# Patient Record
Sex: FEMALE | Race: WHITE | URBAN - METROPOLITAN AREA
[De-identification: names, ages, dates, MRNs, and addresses within clinical notes are randomized per-mention and may not be internally consistent; named-entity substitution may affect disease eponyms.]

---

## 2017-09-01 ENCOUNTER — HOSPITAL ENCOUNTER (EMERGENCY)
Facility: CLINIC | Age: 22
Discharge: HOME | End: 2017-09-01
Attending: EMERGENCY MEDICINE | Admitting: EMERGENCY MEDICINE
Payer: COMMERCIAL

## 2017-09-01 VITALS
OXYGEN SATURATION: 98 % | TEMPERATURE: 98.7 F | WEIGHT: 160 LBS | HEART RATE: 70 BPM | SYSTOLIC BLOOD PRESSURE: 129 MMHG | DIASTOLIC BLOOD PRESSURE: 87 MMHG | RESPIRATION RATE: 16 BRPM

## 2017-09-01 DIAGNOSIS — M54.41 ACUTE RIGHT-SIDED LOW BACK PAIN WITH RIGHT-SIDED SCIATICA: ICD-10-CM

## 2017-09-01 LAB
ALBUMIN UR-MCNC: NEGATIVE MG/DL
APPEARANCE UR: CLEAR
BACTERIA #/AREA URNS HPF: ABNORMAL /HPF
BILIRUB UR QL STRIP: NEGATIVE
COLOR UR AUTO: ABNORMAL
GLUCOSE UR STRIP-MCNC: NEGATIVE MG/DL
HCG UR QL: NEGATIVE
HGB UR QL STRIP: ABNORMAL
KETONES UR STRIP-MCNC: NEGATIVE MG/DL
LEUKOCYTE ESTERASE UR QL STRIP: NEGATIVE
MUCOUS THREADS #/AREA URNS LPF: PRESENT /LPF
NITRATE UR QL: NEGATIVE
PH UR STRIP: 7 PH (ref 5–7)
RBC #/AREA URNS AUTO: 1 /HPF (ref 0–2)
SOURCE: ABNORMAL
SP GR UR STRIP: 1.01 (ref 1–1.03)
SQUAMOUS #/AREA URNS AUTO: <1 /HPF (ref 0–1)
UROBILINOGEN UR STRIP-MCNC: 0 MG/DL (ref 0–2)
WBC #/AREA URNS AUTO: 1 /HPF (ref 0–2)

## 2017-09-01 PROCEDURE — 81025 URINE PREGNANCY TEST: CPT | Performed by: EMERGENCY MEDICINE

## 2017-09-01 PROCEDURE — 99285 EMERGENCY DEPT VISIT HI MDM: CPT | Mod: 25

## 2017-09-01 PROCEDURE — 96375 TX/PRO/DX INJ NEW DRUG ADDON: CPT

## 2017-09-01 PROCEDURE — 81001 URINALYSIS AUTO W/SCOPE: CPT | Performed by: EMERGENCY MEDICINE

## 2017-09-01 PROCEDURE — 25000128 H RX IP 250 OP 636: Performed by: EMERGENCY MEDICINE

## 2017-09-01 PROCEDURE — 96374 THER/PROPH/DIAG INJ IV PUSH: CPT

## 2017-09-01 RX ORDER — ACETAMINOPHEN 325 MG/1
650 TABLET ORAL EVERY 6 HOURS PRN
Qty: 100 TABLET | Refills: 0 | Status: SHIPPED | OUTPATIENT
Start: 2017-09-01

## 2017-09-01 RX ORDER — DIAZEPAM 10 MG/2ML
5 INJECTION, SOLUTION INTRAMUSCULAR; INTRAVENOUS ONCE
Status: COMPLETED | OUTPATIENT
Start: 2017-09-01 | End: 2017-09-01

## 2017-09-01 RX ORDER — KETOROLAC TROMETHAMINE 15 MG/ML
15 INJECTION, SOLUTION INTRAMUSCULAR; INTRAVENOUS ONCE
Status: COMPLETED | OUTPATIENT
Start: 2017-09-01 | End: 2017-09-01

## 2017-09-01 RX ORDER — NAPROXEN 500 MG/1
500 TABLET ORAL 2 TIMES DAILY WITH MEALS
Qty: 16 TABLET | Refills: 0 | Status: SHIPPED | OUTPATIENT
Start: 2017-09-01 | End: 2017-09-09

## 2017-09-01 RX ORDER — HYDROMORPHONE HYDROCHLORIDE 1 MG/ML
0.5 INJECTION, SOLUTION INTRAMUSCULAR; INTRAVENOUS; SUBCUTANEOUS
Status: DISCONTINUED | OUTPATIENT
Start: 2017-09-01 | End: 2017-09-01 | Stop reason: HOSPADM

## 2017-09-01 RX ORDER — CYCLOBENZAPRINE HCL 10 MG
10 TABLET ORAL
Qty: 14 TABLET | Refills: 1 | Status: SHIPPED | OUTPATIENT
Start: 2017-09-01 | End: 2017-09-02

## 2017-09-01 RX ADMIN — KETOROLAC TROMETHAMINE 15 MG: 15 INJECTION, SOLUTION INTRAMUSCULAR; INTRAVENOUS at 11:07

## 2017-09-01 RX ADMIN — DIAZEPAM 5 MG: 5 INJECTION, SOLUTION INTRAMUSCULAR; INTRAVENOUS at 11:11

## 2017-09-01 RX ADMIN — HYDROMORPHONE HYDROCHLORIDE 0.5 MG: 1 INJECTION, SOLUTION INTRAMUSCULAR; INTRAVENOUS; SUBCUTANEOUS at 12:27

## 2017-09-01 ASSESSMENT — ENCOUNTER SYMPTOMS
DYSURIA: 0
VOMITING: 0
BACK PAIN: 1
CHILLS: 0
NUMBNESS: 1
FEVER: 0
FREQUENCY: 0
SHORTNESS OF BREATH: 0

## 2017-09-01 NOTE — DISCHARGE INSTRUCTIONS
Please see your PCP in 2-3 days for a recheck.  If you have increasing pain, loss of bowel or bladder function, numbness in your groin, unable to walk, fevers >101 or other acute changes, return to the ED.      May take tylenol and ibuprofen for pain.  If breakthrough pain may try flexeril.  Do not drink drive or operate heavy machinery.     Discharge Instructions  Back Pain  You were seen today for back pain. Back pain can have many causes, but most will get better without surgery or other specific treatment. Sometimes there is a herniated ( slipped ) disc. We do not usually do MRI scans to look for these right away, since most herniated discs will get better on their own with time.  Today, we did not find any evidence that your back pain was caused by a serious condition. However, sometimes symptoms develop over time and cannot be found during an emergency visit, so it is very important that you follow up with your primary provider.  Generally, every Emergency Department visit should have a follow-up clinic visit with either a primary or a specialty clinic/provider. Please follow-up as instructed by your emergency provider today.    Return to the Emergency Department if:    You develop a fever with your back pain.     You have weakness or change in sensation in one or both legs.    You lose control of your bowels or bladder, or cannot empty your bladder (cannot pee).    Your pain gets much worse.     Follow-up with your provider:    Unless your pain has completely gone away, please make an appointment with your provider within one week. Most of the routine care for back pain is available in a clinic and not the Emergency Department. You may need further management of your back pain, such as more pain medication, imaging such as an X-ray or MRI, or physical therapy.    What can I do to help myself?    Remain Active -- People are often afraid that they will hurt their back further or delay recovery by remaining  active, but this is one of the best things you can do for your back. In fact, staying in bed for a long time to rest is not recommended. Studies have shown that people with low back pain recover faster when they remain active. Movement helps to bring blood flow to the muscles and relieve muscle spasms as well as preventing loss of muscle strength.    Heat -- Using a heating pad can help with low back pain during the first few weeks. Do not sleep with a heating pad, as you can be burned.     Pain medications - You may take a pain medication such as Tylenol  (acetaminophen), Advil , Motrin  (ibuprofen) or Aleve  (naproxen).  If you were given a prescription for medicine here today, be sure to read all of the information (including the package insert) that comes with your prescription.  This will include important information about the medicine, its side effects, and any warnings that you need to know about.  The pharmacist who fills the prescription can provide more information and answer questions you may have about the medicine.  If you have questions or concerns that the pharmacist cannot address, please call or return to the Emergency Department.   Remember that you can always come back to the Emergency Department if you are not able to see your regular provider in the amount of time listed above, if you get any new symptoms, or if there is anything that worries you.

## 2017-09-01 NOTE — ED AVS SNAPSHOT
[facility] Emergency Department    [address]    [facility] [address]    Phone:  [phone]    Fax:  [phone]                                       Otto Curran   MRN: [id number]    Department:  [facility] Emergency Department   Date of Visit:  9/1/2017           After Visit Summary Signature Page     I have received my discharge instructions, and my questions have been answered. I have discussed any challenges I see with this plan with the nurse or doctor.    ..........................................................................................................................................  Patient/Patient Representative Signature      ..........................................................................................................................................  Patient Representative Print Name and Relationship to Patient    ..................................................               ................................................  Date                                            Time    ..........................................................................................................................................  Reviewed by Signature/Title    ...................................................              ..............................................  Date                                                            Time

## 2017-09-01 NOTE — ED AVS SNAPSHOT
[facility] Emergency Department    [address]    [facility] [address]    Phone:  [phone]    Fax:  [phone]                                       Otto Curran   MRN: [id number]    Department:  [facility] Emergency Department   Date of Visit:  9/1/2017           Patient Information     Date Of Birth          1995        Your diagnoses for this visit were:     Acute right-sided low back pain with right-sided sciatica        You were seen by Patricia Garay MD.      Follow-up Information     Follow up with No Ref-Primary, Physician. Go in 3 days.        Discharge Instructions       Please see your PCP in 2-3 days for a recheck.  If you have increasing pain, loss of bowel or bladder function, numbness in your groin, unable to walk, fevers >101 or other acute changes, return to the ED.      May take tylenol and ibuprofen for pain.  If breakthrough pain may try flexeril.  Do not drink drive or operate heavy machinery.     Discharge Instructions  Back Pain  You were seen today for back pain. Back pain can have many causes, but most will get better without surgery or other specific treatment. Sometimes there is a herniated ( slipped ) disc. We do not usually do MRI scans to look for these right away, since most herniated discs will get better on their own with time.  Today, we did not find any evidence that your back pain was caused by a serious condition. However, sometimes symptoms develop over time and cannot be found during an emergency visit, so it is very important that you follow up with your primary provider.  Generally, every Emergency Department visit should have a follow-up clinic visit with either a primary or a specialty clinic/provider. Please follow-up as instructed by your emergency provider today.    Return to the Emergency Department if:    You develop a fever with your back pain.     You have weakness or change in sensation in one or both  legs.    You lose control of your bowels or bladder, or cannot empty your bladder (cannot pee).    Your pain gets much worse.     Follow-up with your provider:    Unless your pain has completely gone away, please make an appointment with your provider within one week. Most of the routine care for back pain is available in a clinic and not the Emergency Department. You may need further management of your back pain, such as more pain medication, imaging such as an X-ray or MRI, or physical therapy.    What can I do to help myself?    Remain Active -- People are often afraid that they will hurt their back further or delay recovery by remaining active, but this is one of the best things you can do for your back. In fact, staying in bed for a long time to rest is not recommended. Studies have shown that people with low back pain recover faster when they remain active. Movement helps to bring blood flow to the muscles and relieve muscle spasms as well as preventing loss of muscle strength.    Heat -- Using a heating pad can help with low back pain during the first few weeks. Do not sleep with a heating pad, as you can be burned.     Pain medications - You may take a pain medication such as Tylenol  (acetaminophen), Advil , Motrin  (ibuprofen) or Aleve  (naproxen).  If you were given a prescription for medicine here today, be sure to read all of the information (including the package insert) that comes with your prescription.  This will include important information about the medicine, its side effects, and any warnings that you need to know about.  The pharmacist who fills the prescription can provide more information and answer questions you may have about the medicine.  If you have questions or concerns that the pharmacist cannot address, please call or return to the Emergency Department.   Remember that you can always come back to the Emergency Department if you are not able to see your regular provider in the amount  of time listed above, if you get any new symptoms, or if there is anything that worries you.      24 Hour Appointment Hotline       To make an appointment at any [facility]3], call [phone] ([phone]). If you don't have a family doctor or clinic, we will help you find one. [facility] clinics are conveniently located to serve the needs of you and your family.             Review of your medicines      START taking        Dose / Directions Last dose taken    acetaminophen 325 MG tablet   Commonly known as:  TYLENOL   Dose:  650 mg   Quantity:  100 tablet        Take 2 tablets (650 mg) by mouth every 6 hours as needed for mild pain   Refills:  0        cyclobenzaprine 10 MG tablet   Commonly known as:  FLEXERIL   Dose:  10 mg   Quantity:  14 tablet        Take 1 tablet (10 mg) by mouth nightly as needed for muscle spasms   Refills:  1        naproxen 500 MG tablet   Commonly known as:  NAPROSYN   Dose:  500 mg   Quantity:  16 tablet        Take 1 tablet (500 mg) by mouth 2 times daily (with meals) for 8 days   Refills:  0                Prescriptions were sent or printed at these locations (3 Prescriptions)                   Other Prescriptions                Printed at Department/Unit printer (3 of 3)         naproxen (NAPROSYN) 500 MG tablet               acetaminophen (TYLENOL) 325 MG tablet               cyclobenzaprine (FLEXERIL) 10 MG tablet                Procedures and tests performed during your visit     HCG qualitative urine    UA with Microscopic      Orders Needing Specimen Collection     None      Pending Results     No orders found from 8/30/2017 to 9/2/2017.            Pending Culture Results     No orders found from 8/30/2017 to 9/2/2017.            Pending Results Instructions     If you had any lab results that were not finalized at the time of your Discharge, you can call the ED Lab Result RN at [phone]. You will be contacted by this team for any positive Lab results or changes in  treatment. The nurses are available 7 days a week from 10A to 6:30P.  You can leave a message 24 hours per day and they will return your call.        Test Results From Your Hospital Stay        9/1/2017 10:48 AM      Component Results     Component Value Ref Range & Units Status    Color Urine Straw  Final    Appearance Urine Clear  Final    Glucose Urine Negative NEG^Negative mg/dL Final    Bilirubin Urine Negative NEG^Negative Final    Ketones Urine Negative NEG^Negative mg/dL Final    Specific Gravity Urine 1.008 1.003 - 1.035 Final    Blood Urine Small (A) NEG^Negative Final    pH Urine 7.0 5.0 - 7.0 pH Final    Protein Albumin Urine Negative NEG^Negative mg/dL Final    Urobilinogen mg/dL 0.0 0.0 - 2.0 mg/dL Final    Nitrite Urine Negative NEG^Negative Final    Leukocyte Esterase Urine Negative NEG^Negative Final    Source Midstream Urine  Final    WBC Urine 1 0 - 2 /HPF Final    RBC Urine 1 0 - 2 /HPF Final    Bacteria Urine Few (A) NEG^Negative /HPF Final    Squamous Epithelial /HPF Urine <1 0 - 1 /HPF Final    Mucous Urine Present (A) NEG^Negative /LPF Final         9/1/2017 10:48 AM      Component Results     Component Value Ref Range & Units Status    HCG Qual Urine Negative NEG^Negative Final    This test is for screening purposes.  Results should be interpreted along with   the clinical picture.  Confirmation testing is available if warranted by   ordering [id number], HCG Quantitative Pregnancy.                  Clinical Quality Measure: Blood Pressure Screening     Your blood pressure was checked while you were in the emergency department today. The last reading we obtained was  BP: 109/67 . Please read the guidelines below about what these numbers mean and what you should do about them.  If your systolic blood pressure (the top number) is less than 120 and your diastolic blood pressure (the bottom number) is less than 80, then your blood pressure is normal. There is nothing more that you need to do about  "it.  If your systolic blood pressure (the top number) is 120-139 or your diastolic blood pressure (the bottom number) is 80-89, your blood pressure may be higher than it should be. You should have your blood pressure rechecked within a year by a primary care provider.  If your systolic blood pressure (the top number) is 140 or greater or your diastolic blood pressure (the bottom number) is 90 or greater, you may have high blood pressure. High blood pressure is treatable, but if left untreated over time it can put you at risk for heart attack, stroke, or kidney failure. You should have your blood pressure rechecked by a primary care provider within the next 4 weeks.  If your provider in the emergency department today gave you specific instructions to follow-up with your doctor or provider even sooner than that, you should follow that instruction and not wait for up to 4 weeks for your follow-up visit.        Thank you for choosing [facility]       Thank you for choosing [facility] for your care. Our goal is always to provide you with excellent care. Hearing back from our patients is one way we can continue to improve our services. Please take a few minutes to complete the written survey that you may receive in the mail after you visit with us. Thank you!        [employer] Information     [employer] lets you send messages to your doctor, view your test results, renew your prescriptions, schedule appointments and more. To sign up, go to [email or web address] . Click on \"Log in\" on the left side of the screen, which will take you to the Welcome page. Then click on \"Sign up Now\" on the right side of the page.     You will be asked to enter the access code listed below, as well as some personal information. Please follow the directions to create your username and password.     Your access code is: [email or web address]  Expires: 2017  1:22 PM     Your access code will  in 90 days. If you need help or a new code, please call " your [place] clinic or [phone].        Care EveryWhere ID     This is your Care EveryWhere ID. This could be used by other organizations to access your [place] medical records  [id number]        Equal Access to Services     ALEKSANDR CHIANG : Anahy Man, waaxda luqadaha, qaybta kaalmada chicho, jose leslie. So [facility] [phone].    ATENCIÓN: Si habla español, tiene a lora disposición servicios gratuitos de asistencia lingüística. Llame al [phone].    We comply with applicable federal civil rights laws and [place] laws. We do not discriminate on the basis of race, color, national origin, age, disability sex, sexual orientation or gender identity.            After Visit Summary       This is your record. Keep this with you and show to your community pharmacist(s) and doctor(s) at your next visit.

## 2017-09-01 NOTE — ED PROVIDER NOTES
"  History     Chief Complaint:  Back Pain      HPI   Otto (\"Daxton\") Magdy is a 22 year old transgender male who presents to the emergency department today via EMS for evaluation of back pain. A couple weeks ago, the patient began having right low back radiating through his right hip to his right knee. He was driving to work this morning and had a spasm of unbearable pain so he called EMS. Due to concern for symptoms being \"fatal,\" the patient presents to the emergency department via EMS for further evaluation. Associated symptoms include right foot tingling and numbness. The patient reports having trouble getting out of bed, with aggravated pain while bending, standing, and walking. He took ibuprofen yesterday with no relief. Of note, this morning the patient took 1500 mg of tylenol this morning. He denies incontinence, urinary symptoms, IV drug use, fever, chills, vomiting, chest pain, shortness of breath, or groin numbness. 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 Furthermore, his mother who is a pediatrician told him she thought it was musculoskeletal in nature.    Allergies:  No Known Drug Allergies     Medications:    The patient is currently on no regular medications.    Past Medical History:    Transgender  Depression    Past Surgical History:    History reviewed. No pertinent surgical history.    Family History:    The patient is adopted. Family history unknown.    Social History:  The patient was accompanied to the ED by alone.  Smoking Status: Former (0.02 ppd)  Smokeless Tobacco: Current  Alcohol Use: No  Marital Status:  Single     Review of Systems   Constitutional: Negative for chills and fever.   Respiratory: Negative for shortness of breath.    Cardiovascular: Negative for chest pain.   Gastrointestinal: Negative for vomiting.   Genitourinary: Negative for dysuria and frequency.        Denies incontinence   Musculoskeletal: Positive for back pain (right lower, radiating " through hip to right knee).   Neurological: Positive for numbness (right foot).        Denies groin numbness   All other systems reviewed and are negative.    Physical Exam   First Vitals:  BP: 133/70  Pulse: 70  Temp: 98.7  F (37.1  C)  Resp: 16  Weight: 72.6 kg (160 lb)  SpO2: 98 %      Physical Exam  General: Resting on the bed.  Head: No obvious trauma to head.  Ears, Nose, Throat:  External ears normal.  Nose normal.    Eyes:  Conjunctivae and EOM are normal.  Pupils are equal, round, and reactive.   Neck: Normal range of motion.  Neck supple.   CV: Regular rate and rhythm.  No murmurs.      Respiratory: Effort normal and breath sounds normal.  No wheezing or crackles.   Gastrointestinal: Soft.  No distension. There is no tenderness.    Musculoskeletal: Normal range of motion. Right sided lumbar paraspinous tenderness, no tenderness to midline palpitation, No SI joint pain  Neuro: Alert. Moving all extremities appropriately.  Normal speech. 5/5 strength bilateral lower extremities.  Sensation intact.  Negative straight leg, Mild numbness near right 1st toe.  2+ patellar reflexes.  Normal achilles reflexes.    Skin: Skin is warm and dry.  No rash noted.   Psych: Normal mood and affect. Behavior is normal.     Emergency Department Course     Laboratory:  Laboratory findings were communicated with the patient who voiced understanding of the findings.  UA with Microscopic: Urine Bloo Small, Bacteria Few, Mucous Urine Present, o/w WNL.  HCG Qualitative Urine: Negative    Interventions:  1107 Toradol 15 mg IV  1111 Valium 5 mg IV  1227 Dilaudid 0.5mg IV      Emergency Department Course:  Nursing notes and vitals reviewed.  The patient provided a urine sample here in the emergency department. This was sent for laboratory testing, findings above.  1014: I performed an exam of the patient as documented above.   1149: Patient rechecked and updated.   I discussed the treatment plan with the patient. They expressed  understanding of this plan and consented to discharge. They will be discharged home with instructions for care and follow up. In addition, the patient will return to the emergency department if their symptoms persist, worsen, if new symptoms arise or if there is any concern.  All questions were answered.  I personally reviewed the laboratory results with the Patient and answered all related questions prior to discharge.    Impression & Plan      Medical Decision Making:  Otto Curran is a 22 year old transgender male presenting with low back pain. VS reassuring.  Broad differential considered including but not limited to spasm, epidural abscess/hematoma, spinal cord compression, fracture, etc.  He reports several weeks ago that he pulled his back and has had increasing pain over the last several days. Of note, he denies any bowel or bladder symptoms, IV drug use, any procedures or injections in her back. Overall there are no red flags to suggest acute spinal cord process. He does appear to have some sciatica. On examination he has some tenderness to palpation of the paraspinous muscle. Overall no midline tenderness to palpitation, no history of trauma to indication imaging. Plan to ambulate following pain medications. The patient was ambulatory after above interventions. She had resolution of the numbness over her first toe. She was able to ambulate with only 0-1/10 pain. No focal neurological changes to suggest acute spinal cord pathology. Urinalysis not concerning for UTI/pyelo, pregnancy test negative. Urine did have some small blood but do not suspect this as nephrolithiasis. Based on examination appears to be most likely paraspinous muscle spasm. She was advised to follow up with primary care physician next week as already arranged. Use tylenol, naprosyn, and flexeril for pain. Activity is tolerated. Discharged in stable condition.    Diagnosis:    ICD-10-CM    1. Acute right-sided low back pain with right-sided  sciatica M54.41        Disposition:  Discharged to home    Discharge Medications:  New Prescriptions    ACETAMINOPHEN (TYLENOL) 325 MG TABLET    Take 2 tablets (650 mg) by mouth every 6 hours as needed for mild pain    CYCLOBENZAPRINE (FLEXERIL) 10 MG TABLET    Take 1 tablet (10 mg) by mouth nightly as needed for muscle spasms    NAPROXEN (NAPROSYN) 500 MG TABLET    Take 1 tablet (500 mg) by mouth 2 times daily (with meals) for 8 days         Scribe Disclosure:  Adriana SALDAÑA, am serving as a scribe at 8:53 AM on 9/1/2017 to document services personally performed by Patricia Garay MD based on my observations and the provider's statements to me.   9/1/2017   [facility] EMERGENCY DEPARTMENT       Patricia Garay MD  09/01/17 1933

## 2017-09-02 ENCOUNTER — HOSPITAL ENCOUNTER (EMERGENCY)
Facility: CLINIC | Age: 22
Discharge: HOME | End: 2017-09-02
Attending: EMERGENCY MEDICINE | Admitting: EMERGENCY MEDICINE

## 2017-09-02 VITALS
SYSTOLIC BLOOD PRESSURE: 142 MMHG | HEIGHT: 66 IN | HEART RATE: 80 BPM | BODY MASS INDEX: 25.39 KG/M2 | DIASTOLIC BLOOD PRESSURE: 99 MMHG | TEMPERATURE: 98.2 F | OXYGEN SATURATION: 93 % | WEIGHT: 158 LBS | RESPIRATION RATE: 18 BRPM

## 2017-09-02 DIAGNOSIS — M62.830 LUMBAR PARASPINAL MUSCLE SPASM: ICD-10-CM

## 2017-09-02 DIAGNOSIS — M54.41 ACUTE RIGHT-SIDED LOW BACK PAIN WITH RIGHT-SIDED SCIATICA: ICD-10-CM

## 2017-09-02 PROCEDURE — 99282 EMERGENCY DEPT VISIT SF MDM: CPT

## 2017-09-02 RX ORDER — METHOCARBAMOL 500 MG/1
1000 TABLET, FILM COATED ORAL 3 TIMES DAILY PRN
Qty: 30 TABLET | Refills: 1 | Status: SHIPPED | OUTPATIENT
Start: 2017-09-02 | End: 2017-09-07

## 2017-09-02 ASSESSMENT — ENCOUNTER SYMPTOMS
NUMBNESS: 0
WEAKNESS: 0
BACK PAIN: 1
FEVER: 0

## 2017-09-02 NOTE — ED AVS SNAPSHOT
[facility] Emergency Department    [address]    [facility] [address]    Phone:  [phone]    Fax:  [phone]                                       Otto Curran   MRN: [id number]    Department:  [facility] Emergency Department   Date of Visit:  9/2/2017           After Visit Summary Signature Page     I have received my discharge instructions, and my questions have been answered. I have discussed any challenges I see with this plan with the nurse or doctor.    ..........................................................................................................................................  Patient/Patient Representative Signature      ..........................................................................................................................................  Patient Representative Print Name and Relationship to Patient    ..................................................               ................................................  Date                                            Time    ..........................................................................................................................................  Reviewed by Signature/Title    ...................................................              ..............................................  Date                                                            Time

## 2017-09-02 NOTE — DISCHARGE INSTRUCTIONS
Please see your primary care doctor in the next 5 days for a recheck.   Return to the Emergency Room if you develop fevers, leg weakness, trouble with bowel or bladder function, or if you have any new concerns about your health.   It was my pleasure to take care of you today. Thanks for visiting [facility]   Emergency Room.     Gideon Davis MD       Discharge Instructions   Back Pain   You were seen today for back pain. Back pain can have many causes, but most will get better without surgery or other specific treatment. Sometimes there is a herniated ( slipped ) disc. We don t usually do MRI scans to look for these right away, since most herniated discs will get better on their own with time. Today, we did not find any evidence that your back pain was caused by a serious condition, such as an infection, fracture, or tumor. However, sometimes symptoms develop over time and cannot be found during an emergency visit, so it is very important that you follow up with your primary doctor.  Return to the Emergency Department if:   You develop a fever with your back pain.   You have weakness or change in sensation in one or both legs.   You lose control of your bowels or bladder, or can t empty your bladder.   Your pain gets much worse.   Follow-up with your doctor:   Unless your pain has completely gone away, please make an appointment with your doctor within one week. You may need further management of your back pain, such as more pain medication, imaging such as an X-ray or MRI, or physical therapy.  What can I do to help myself?   Remain Active -- People are often afraid that they will hurt their back further or delay recovery by remaining active, but this is one of the best things you can do for your back. In fact, prolonged bed rest is not recommended. Studies have shown that people with low back pain recover faster when they remain active. Movement helps to bring blood flow to the muscles and relieve muscle  spasms as well as preventing loss of muscle strength.   Heat -- Using a heating pad can help with low back pain during the first few weeks. Do not sleep with a heating pad, as you can be burned.   Pain medications - You may take a pain medication such as Tylenol  (acetaminophen), Advil , Nuprin  (ibuprofen) or Aleve  (naproxen). If you have been given a narcotic such as Vicodin  (hydrocodone with acetaminophen), Percocet  (oxycodone with acetaminophen), codeine, or a muscle relaxant such as Flexeril  (cyclobenzaprine) or Soma  (carisoprodol), do not drive for four hours after you have taken it. If the narcotic contains Tylenol  (acetaminophen), do not take Tylenol  with it. All narcotics will cause constipation, so eat a high fiber diet.   If you were given a prescription for medicine here today, be sure to read all of the information (including the package insert) that comes with your prescription. This will include important information about the medicine, its side effects, and any warnings that you need to know about. The pharmacist who fills the prescription can provide more information and answer questions you may have about the medicine. If you have questions or concerns that the pharmacist cannot address, please call or return to the Emergency Department.

## 2017-09-02 NOTE — ED NOTES
"Pt presents c/o having right knee/lower leg pain. Pt was evaluated here yesterday, was given rx's. Had only taken the flexeril and tyl, not the Naprosyn. Pt c/o pain is \"worse\". Pt is A&O, ABC's intact.   "

## 2017-09-02 NOTE — ED PROVIDER NOTES
"  History     Chief Complaint:  Leg pain    HPI   Otto Curran is a 22 year old transgender person who identifies as male gender who presents for evaluation of leg pain. The patient reports onset of right lower back pain 5 days ago. The patient was progressively worse until yesterday, when he was unable to move secondary to the pain, which as radiating into his RLE. The patient was seen here in the ED yesterday and prescribed Naproxen, Tylenol and flexeril for sciatica. At 0200 this morning the patient woke with pain in his right knee to right ankle. He has been unable to ambulate secondary to the pain. He is only having mild back pain now. The mediations have not been working. The patient does not report any numbness, weakness or other symptoms.     Allergies:  No known drug allergies.     Medications:    Naproxen  Tylenol   Flexeril     Past Medical History:    Episodic mood disorder     Past Surgical History:    History reviewed. No pertinent past surgical history.     Family History:    No pertinent family history.     Social History:  Marital Status:  Single  Smoking status: Current smoker  Alcohol use: No      Review of Systems   Constitutional: Negative for fever.   Musculoskeletal: Positive for back pain and gait problem.        Positive leg pain   Neurological: Negative for weakness and numbness.   All other systems reviewed and are negative.      Physical Exam     Patient Vitals for the past 24 hrs:   BP Temp Pulse Resp SpO2 Height Weight   09/02/17 1557 (!) 142/99 98.2  F (36.8  C) 80 18 (!) 39 % 1.676 m (5' 6\") 71.7 kg (158 lb)      Physical Exam  General: Patient is alert and interactive when I enter the room  Head:  The scalp, face, and head appear normal  Eyes:  The pupils are equal, round, and reactive to light    Conjunctivae and sclerae are normal  ENT:    External acoustic canals are normal    The oropharynx is normal without erythema.     Uvula is in the midline  Neck:  Normal range of " motion  CV:  Regular rate. S1/S2. No murmurs.   Resp:  Lungs are clear without wheezes or rales. No distress  GI:  Abdomen is soft, no rigidity, guarding, or rebound    No distension. No tenderness to palpation in any quadrant.    No flank pain to palpation.      Skin:  No rash or lesions noted. Normal capillary refill noted  Neuro: Speech is normal and fluent. Face is symmetric.     Moving all extremities well. See MS section below as well.  Psych:  Awake. Alert.  Normal affect.  Appropriate interactions.  Lymph: No anterior cervical lymphadenopathy noted  MS:  Normal tone. Joints grossly normal without effusions.     No asymmetric leg swelling, thigh tenderness.  Right calf is tender but compartments normal. Skin on right calf normal. No leg swelling.   Detailed Back Exam:     Reflexes at patella and achilles are normal.  Sensation is intact in the legs and pelvis including the lower sacral nerve roots.  They are tender in the lower lumbar area.  There is right sided significant paraspinal muscle spasm.  There is no redness or edema about the back.  No midline spinal pain and no stepoffs.  SI joint is not tender.  No pain over piriformis muscle. Detailed strength exam is performed in lower extremities, there is symmetrical strength in all myotomes tested both proximally and distally.         Emergency Department Course     Emergency Department Course:  Nursing notes and vitals reviewed.  (5274) I performed an exam of the patient as documented above. Findings and plan explained to the patient. Patient discharged home with instructions regarding supportive care, medications, and reasons to return. The importance of close follow-up was reviewed. The patient was prescribed Robaxin.      Impression & Plan      Medical Decision Making:  Otto Curran is a 22 year old male who presents for evaluation of back pain and radicular symptoms. They have no history of back pain in the past.  His pain has improved with  interventions in the emergency department. He did not sustain any trauma, therefore x-rays are not necessary due to the low likelihood of fracture or subluxation. Advanced imaging with CT/MRI is not indicated at this time, but may be indicated in the future if symptoms fail to resolve.  Nor is there any indication for consultation with neurosurgery or orthopedic spinal surgeon.  There is no clinical evidence of cauda equina syndrome, discitis, spinal/epidural space hematoma or epidural abscess or other emergently worrisome etiology.     The neurological exam is normal, with the exception of the dermatomal symptoms noted.  The patient was advised that radiculopathy often takes significant time to resolve, and that follow up with primary care, neurology and/or neurosurgery will be indicated if symptoms do not improve. He will be discharged with pain medications to use as directed.  No heavy lifting, bending or twisting. Return if increasing pain, muscular weakness, or bowel or bladder dysfunction.     Diagnosis:    ICD-10-CM   1. Acute right-sided low back pain with right-sided sciatica M54.41   2. Lumbar paraspinal muscle spasm M62.830       Disposition:  Patient is discharged to home.     Discharge Medications:  New Prescriptions    METHOCARBAMOL (ROBAXIN) 500 MG TABLET    Take 2 tablets (1,000 mg) by mouth 3 times daily as needed for muscle spasms   Referral to Physical Therapy    9/2/2017   [facility] EMERGENCY DEPARTMENT    Man SALDAÑA, am serving as a scribe on 9/2/2017 at 4:09 PM to personally document services performed by Dr. Davis based on my observations and the provider's statements to me.       Gideon Davis MD  09/02/17 7153

## 2017-09-02 NOTE — ED AVS SNAPSHOT
[facility] Emergency Department    [address]    [facility] [address]    Phone:  [phone]    Fax:  [phone]                                       Otto Curran   MRN: [id number]    Department:  [facility] Emergency Department   Date of Visit:  9/2/2017           Patient Information     Date Of Birth          1995        Your diagnoses for this visit were:     Acute right-sided low back pain with right-sided sciatica     Lumbar paraspinal muscle spasm        You were seen by Gideon Davis MD.        Discharge Instructions       Please see your primary care doctor in the next 5 days for a recheck.   Return to the Emergency Room if you develop fevers, leg weakness, trouble with bowel or bladder function, or if you have any new concerns about your health.   It was my pleasure to take care of you today. Thanks for visiting [facility]   Emergency Room.     Gideon Davis MD       Discharge Instructions   Back Pain   You were seen today for back pain. Back pain can have many causes, but most will get better without surgery or other specific treatment. Sometimes there is a herniated ( slipped ) disc. We don t usually do MRI scans to look for these right away, since most herniated discs will get better on their own with time. Today, we did not find any evidence that your back pain was caused by a serious condition, such as an infection, fracture, or tumor. However, sometimes symptoms develop over time and cannot be found during an emergency visit, so it is very important that you follow up with your primary doctor.  Return to the Emergency Department if:   You develop a fever with your back pain.   You have weakness or change in sensation in one or both legs.   You lose control of your bowels or bladder, or can t empty your bladder.   Your pain gets much worse.   Follow-up with your doctor:   Unless your pain has completely gone away, please make an  appointment with your doctor within one week. You may need further management of your back pain, such as more pain medication, imaging such as an X-ray or MRI, or physical therapy.  What can I do to help myself?   Remain Active -- People are often afraid that they will hurt their back further or delay recovery by remaining active, but this is one of the best things you can do for your back. In fact, prolonged bed rest is not recommended. Studies have shown that people with low back pain recover faster when they remain active. Movement helps to bring blood flow to the muscles and relieve muscle spasms as well as preventing loss of muscle strength.   Heat -- Using a heating pad can help with low back pain during the first few weeks. Do not sleep with a heating pad, as you can be burned.   Pain medications - You may take a pain medication such as Tylenol  (acetaminophen), Advil , Nuprin  (ibuprofen) or Aleve  (naproxen). If you have been given a narcotic such as Vicodin  (hydrocodone with acetaminophen), Percocet  (oxycodone with acetaminophen), codeine, or a muscle relaxant such as Flexeril  (cyclobenzaprine) or Soma  (carisoprodol), do not drive for four hours after you have taken it. If the narcotic contains Tylenol  (acetaminophen), do not take Tylenol  with it. All narcotics will cause constipation, so eat a high fiber diet.   If you were given a prescription for medicine here today, be sure to read all of the information (including the package insert) that comes with your prescription. This will include important information about the medicine, its side effects, and any warnings that you need to know about. The pharmacist who fills the prescription can provide more information and answer questions you may have about the medicine. If you have questions or concerns that the pharmacist cannot address, please call or return to the Emergency Department.       24 Hour Appointment Hotline       To make an appointment at  any [facility] clinic, call [phone] ([phone]). If you don't have a family doctor or clinic, we will help you find one. [facility] are conveniently located to serve the needs of you and your family.          ED Discharge Orders     [facility] PT, HAND, AND CHIROPRACTIC REFERRAL       **This order will print in the [facility] Scheduling Office**    Physical Therapy, Hand Therapy and Chiropractic Care are available through:    *[facility]  *[facility]  *[facility]    Call one number to schedule at any of the above locations: [phone].    Your provider has referred you to: [facility] - PT and/or Chiropractic Care determined by clinical presentation at [facility] or [facility] Initial Visit    Indication/Reason for Referral: low back pain  Therapy Orders: Evaluate and Treat  Special Programs:   Special Request: Krystal Luna      Additional Comments for the Therapist or Chiropractor:     Please be aware that coverage of these services is subject to the terms and limitations of your health insurance plan.  Call member services at your health plan with any benefit or coverage questions.      Please bring the following to your appointment:    *Your personal calendar for scheduling future appointments  *Comfortable clothing                     Review of your medicines      START taking        Dose / Directions Last dose taken    methocarbamol 500 MG tablet   Commonly known as:  ROBAXIN   Dose:  1000 mg   Quantity:  30 tablet        Take 2 tablets (1,000 mg) by mouth 3 times daily as needed for muscle spasms   Refills:  1          Our records show that you are taking the medicines listed below. If these are incorrect, please call your family doctor or clinic.        Dose / Directions Last dose taken    acetaminophen 325 MG tablet   Commonly known as:  TYLENOL   Dose:  650 mg   Quantity:  100 tablet        Take 2 tablets (650 mg) by mouth every 6 hours as  needed for mild pain   Refills:  0        naproxen 500 MG tablet   Commonly known as:  NAPROSYN   Dose:  500 mg   Quantity:  16 tablet        Take 1 tablet (500 mg) by mouth 2 times daily (with meals) for 8 days   Refills:  0          STOP taking        Dose Reason for stopping Comments    cyclobenzaprine 10 MG tablet   Commonly known as:  FLEXERIL                      Prescriptions were sent or printed at these locations (1 Prescription)                   Other Prescriptions                Printed at Department/Unit printer (1 of 1)         methocarbamol (ROBAXIN) 500 MG tablet                Orders Needing Specimen Collection     None      Pending Results     No orders found from 8/31/2017 to 9/3/2017.            Pending Culture Results     No orders found from 8/31/2017 to 9/3/2017.            Pending Results Instructions     If you had any lab results that were not finalized at the time of your Discharge, you can call the ED Lab Result RN at [phone]. You will be contacted by this team for any positive Lab results or changes in treatment. The nurses are available 7 days a week from 10A to 6:30P.  You can leave a message 24 hours per day and they will return your call.        Test Results From Your Hospital Stay               Clinical Quality Measure: Blood Pressure Screening     Your blood pressure was checked while you were in the emergency department today. The last reading we obtained was  BP: (!) 142/99 . Please read the guidelines below about what these numbers mean and what you should do about them.  If your systolic blood pressure (the top number) is less than 120 and your diastolic blood pressure (the bottom number) is less than 80, then your blood pressure is normal. There is nothing more that you need to do about it.  If your systolic blood pressure (the top number) is 120-139 or your diastolic blood pressure (the bottom number) is 80-89, your blood pressure may be higher than it should be. You  "should have your blood pressure rechecked within a year by a primary care provider.  If your systolic blood pressure (the top number) is 140 or greater or your diastolic blood pressure (the bottom number) is 90 or greater, you may have high blood pressure. High blood pressure is treatable, but if left untreated over time it can put you at risk for heart attack, stroke, or kidney failure. You should have your blood pressure rechecked by a primary care provider within the next 4 weeks.  If your provider in the emergency department today gave you specific instructions to follow-up with your doctor or provider even sooner than that, you should follow that instruction and not wait for up to 4 weeks for your follow-up visit.        Thank you for choosing [facility]       Thank you for choosing [facility] for your care. Our goal is always to provide you with excellent care. Hearing back from our patients is one way we can continue to improve our services. Please take a few minutes to complete the written survey that you may receive in the mail after you visit with us. Thank you!        Conjure Information     Conjure lets you send messages to your doctor, view your test results, renew your prescriptions, schedule appointments and more. To sign up, go to [email or web address] . Click on \"Log in\" on the left side of the screen, which will take you to the Welcome page. Then click on \"Sign up Now\" on the right side of the page.     You will be asked to enter the access code listed below, as well as some personal information. Please follow the directions to create your username and password.     Your access code is: [email or web address]  Expires: 2017  1:22 PM     Your access code will  in 90 days. If you need help or a new code, please call your [facility] clinic or [phone].        Care EveryWhere ID     This is your Care EveryWhere ID. This could be used by other organizations to access your [facility] medical " records  [id number]        Equal Access to Services     ALEKSANDR CHIANG : Anahy Man, kendall page, lauro valentino, jose leslie. So [facility] [phone].    ATENCIÓN: Si habla español, tiene a lora disposición servicios gratuitos de asistencia lingüística. Llame al [phone].    We comply with applicable federal civil rights laws and [place] laws. We do not discriminate on the basis of race, color, national origin, age, disability sex, sexual orientation or gender identity.            After Visit Summary       This is your record. Keep this with you and show to your community pharmacist(s) and doctor(s) at your next visit.

## 2017-11-17 ENCOUNTER — TELEPHONE (OUTPATIENT)
Dept: OTHER | Facility: OUTPATIENT CENTER | Age: 22
End: 2017-11-17

## 2017-11-17 NOTE — TELEPHONE ENCOUNTER
[facility] Telephone Intake    Date:  2017  Client Name:  Otto Curran  Preferred Name: Vania      MRN:  [id number]   :  1995       Age:  22 year old     Presenting Problem / Reason for Assessment   (Clinical History &Symptoms):     Around about 7th grade Vania began to notice changes in his gender identity and sexuality. Was bullied often for his attraction to women. Did not feel right in his body. Presents masculine currently in outward appearance. Wants appointments at [facility] to begin HRT and has hopes to have top and bottom surgery in the future. Vania uses he/him/his pronouns. He is open to seeing a therapist if it is recommended.    Vania is on mom's insurance, however, mom is not supportive of appointments here. It was explained that we can protect his privacy at our clinic, however, he will need to discuss with his insurance to determine if mom will have access to billing and appt info etc.    Suggested Program:  {Sexual Medicine/TG)    Length of time experiencing Symptoms:  6-7 years     Seen Other Providers (if so, where):  M.D. :  no  Therapist:  no  Psychiatrist:  no    Is presenting concern primarily sexual or mental health:      Couples:  no    Prescribing Physician:  none  Diagnosis (if known):      Referral Source: [facility]    Legal:  no    Follow Up:    Insurance Benefits to be evaluated.  Note will be entered when validated.    Patient wishes to be contacted regarding Insurance benefits:  Yes-pt requested assistance, wants to know if there is a copay and coverage etc.    Please Verify Registration    Please send Welcome Packet and document date sent.

## 2017-11-17 NOTE — TELEPHONE ENCOUNTER
PER NAIMA @ [facility] - $10.00 CO-PAY, $300 IND   DEDUCT ($26.00MET) 10% CO-INS, W/ AN OOPM $3500 ($197.00MET) NO AUTH REQUIRED,   NO TG EXCLUSIONS, MUST MEET MEDICAL NESS. PATIENT DID NOT WANT US TO CONTACT MOM  RE: WINIFRED.